# Patient Record
Sex: FEMALE | Race: OTHER | HISPANIC OR LATINO | URBAN - METROPOLITAN AREA
[De-identification: names, ages, dates, MRNs, and addresses within clinical notes are randomized per-mention and may not be internally consistent; named-entity substitution may affect disease eponyms.]

---

## 2018-03-06 ENCOUNTER — INPATIENT (INPATIENT)
Facility: HOSPITAL | Age: 78
LOS: 6 days | Discharge: NURSING, REHAB OR LONG-TERM CARE | DRG: 391 | End: 2018-03-13
Attending: INTERNAL MEDICINE | Admitting: INTERNAL MEDICINE
Payer: MEDICARE

## 2018-03-06 VITALS
OXYGEN SATURATION: 99 % | SYSTOLIC BLOOD PRESSURE: 102 MMHG | DIASTOLIC BLOOD PRESSURE: 62 MMHG | HEART RATE: 70 BPM | WEIGHT: 119.05 LBS

## 2018-03-06 DIAGNOSIS — K52.9 NONINFECTIVE GASTROENTERITIS AND COLITIS, UNSPECIFIED: ICD-10-CM

## 2018-03-06 DIAGNOSIS — Z29.9 ENCOUNTER FOR PROPHYLACTIC MEASURES, UNSPECIFIED: ICD-10-CM

## 2018-03-06 DIAGNOSIS — I10 ESSENTIAL (PRIMARY) HYPERTENSION: ICD-10-CM

## 2018-03-06 DIAGNOSIS — F03.90 UNSPECIFIED DEMENTIA WITHOUT BEHAVIORAL DISTURBANCE: ICD-10-CM

## 2018-03-06 LAB
ALBUMIN SERPL ELPH-MCNC: 2.7 G/DL — LOW (ref 3.5–5)
ALP SERPL-CCNC: 106 U/L — SIGNIFICANT CHANGE UP (ref 40–120)
ALT FLD-CCNC: 35 U/L DA — SIGNIFICANT CHANGE UP (ref 10–60)
ANION GAP SERPL CALC-SCNC: 7 MMOL/L — SIGNIFICANT CHANGE UP (ref 5–17)
APPEARANCE UR: CLEAR — SIGNIFICANT CHANGE UP
APTT BLD: 26.2 SEC — LOW (ref 27.5–37.4)
AST SERPL-CCNC: 32 U/L — SIGNIFICANT CHANGE UP (ref 10–40)
BILIRUB SERPL-MCNC: 0.3 MG/DL — SIGNIFICANT CHANGE UP (ref 0.2–1.2)
BILIRUB UR-MCNC: NEGATIVE — SIGNIFICANT CHANGE UP
BUN SERPL-MCNC: 26 MG/DL — HIGH (ref 7–18)
CALCIUM SERPL-MCNC: 8.7 MG/DL — SIGNIFICANT CHANGE UP (ref 8.4–10.5)
CHLORIDE SERPL-SCNC: 110 MMOL/L — HIGH (ref 96–108)
CO2 SERPL-SCNC: 26 MMOL/L — SIGNIFICANT CHANGE UP (ref 22–31)
COLOR SPEC: YELLOW — SIGNIFICANT CHANGE UP
CREAT SERPL-MCNC: 0.74 MG/DL — SIGNIFICANT CHANGE UP (ref 0.5–1.3)
DIFF PNL FLD: ABNORMAL
GLUCOSE SERPL-MCNC: 81 MG/DL — SIGNIFICANT CHANGE UP (ref 70–99)
GLUCOSE UR QL: NEGATIVE — SIGNIFICANT CHANGE UP
HCT VFR BLD CALC: 25 % — LOW (ref 34.5–45)
HGB BLD-MCNC: 8.3 G/DL — LOW (ref 11.5–15.5)
INR BLD: 0.95 RATIO — SIGNIFICANT CHANGE UP (ref 0.88–1.16)
KETONES UR-MCNC: NEGATIVE — SIGNIFICANT CHANGE UP
LEUKOCYTE ESTERASE UR-ACNC: ABNORMAL
LYMPHOCYTES # BLD AUTO: 1 % — LOW (ref 13–44)
MCHC RBC-ENTMCNC: 30.9 PG — SIGNIFICANT CHANGE UP (ref 27–34)
MCHC RBC-ENTMCNC: 33.2 GM/DL — SIGNIFICANT CHANGE UP (ref 32–36)
MCV RBC AUTO: 93 FL — SIGNIFICANT CHANGE UP (ref 80–100)
MONOCYTES NFR BLD AUTO: 2 % — SIGNIFICANT CHANGE UP (ref 2–14)
NEUTROPHILS NFR BLD AUTO: 86 % — HIGH (ref 43–77)
NITRITE UR-MCNC: NEGATIVE — SIGNIFICANT CHANGE UP
OB PNL STL: NEGATIVE — SIGNIFICANT CHANGE UP
PH UR: 6 — SIGNIFICANT CHANGE UP (ref 5–8)
PLATELET # BLD AUTO: 331 K/UL — SIGNIFICANT CHANGE UP (ref 150–400)
POTASSIUM SERPL-MCNC: 4.4 MMOL/L — SIGNIFICANT CHANGE UP (ref 3.5–5.3)
POTASSIUM SERPL-SCNC: 4.4 MMOL/L — SIGNIFICANT CHANGE UP (ref 3.5–5.3)
PROT SERPL-MCNC: 6.3 G/DL — SIGNIFICANT CHANGE UP (ref 6–8.3)
PROT UR-MCNC: 30 MG/DL
PROTHROM AB SERPL-ACNC: 10.3 SEC — SIGNIFICANT CHANGE UP (ref 9.8–12.7)
RBC # BLD: 2.69 M/UL — LOW (ref 3.8–5.2)
RBC # FLD: 13.2 % — SIGNIFICANT CHANGE UP (ref 10.3–14.5)
SODIUM SERPL-SCNC: 143 MMOL/L — SIGNIFICANT CHANGE UP (ref 135–145)
SP GR SPEC: 1.01 — SIGNIFICANT CHANGE UP (ref 1.01–1.02)
TROPONIN I SERPL-MCNC: <0.015 NG/ML — SIGNIFICANT CHANGE UP (ref 0–0.04)
UROBILINOGEN FLD QL: NEGATIVE — SIGNIFICANT CHANGE UP
WBC # BLD: 29.5 K/UL — HIGH (ref 3.8–10.5)
WBC # FLD AUTO: 29.5 K/UL — HIGH (ref 3.8–10.5)

## 2018-03-06 PROCEDURE — 99285 EMERGENCY DEPT VISIT HI MDM: CPT

## 2018-03-06 PROCEDURE — 74177 CT ABD & PELVIS W/CONTRAST: CPT | Mod: 26

## 2018-03-06 PROCEDURE — 71045 X-RAY EXAM CHEST 1 VIEW: CPT | Mod: 26

## 2018-03-06 RX ORDER — SODIUM CHLORIDE 9 MG/ML
1000 INJECTION INTRAMUSCULAR; INTRAVENOUS; SUBCUTANEOUS
Qty: 0 | Refills: 0 | Status: DISCONTINUED | OUTPATIENT
Start: 2018-03-06 | End: 2018-03-08

## 2018-03-06 RX ORDER — PANTOPRAZOLE SODIUM 20 MG/1
40 TABLET, DELAYED RELEASE ORAL DAILY
Qty: 0 | Refills: 0 | Status: DISCONTINUED | OUTPATIENT
Start: 2018-03-06 | End: 2018-03-11

## 2018-03-06 RX ORDER — METRONIDAZOLE 500 MG
500 TABLET ORAL ONCE
Qty: 0 | Refills: 0 | Status: COMPLETED | OUTPATIENT
Start: 2018-03-06 | End: 2018-03-06

## 2018-03-06 RX ORDER — CIPROFLOXACIN LACTATE 400MG/40ML
400 VIAL (ML) INTRAVENOUS EVERY 12 HOURS
Qty: 0 | Refills: 0 | Status: DISCONTINUED | OUTPATIENT
Start: 2018-03-06 | End: 2018-03-13

## 2018-03-06 RX ORDER — CIPROFLOXACIN LACTATE 400MG/40ML
400 VIAL (ML) INTRAVENOUS ONCE
Qty: 0 | Refills: 0 | Status: COMPLETED | OUTPATIENT
Start: 2018-03-06 | End: 2018-03-06

## 2018-03-06 RX ORDER — METRONIDAZOLE 500 MG
500 TABLET ORAL EVERY 8 HOURS
Qty: 0 | Refills: 0 | Status: DISCONTINUED | OUTPATIENT
Start: 2018-03-06 | End: 2018-03-13

## 2018-03-06 RX ORDER — SODIUM CHLORIDE 9 MG/ML
1000 INJECTION INTRAMUSCULAR; INTRAVENOUS; SUBCUTANEOUS ONCE
Qty: 0 | Refills: 0 | Status: COMPLETED | OUTPATIENT
Start: 2018-03-06 | End: 2018-03-06

## 2018-03-06 RX ADMIN — Medication 200 MILLIGRAM(S): at 21:32

## 2018-03-06 RX ADMIN — SODIUM CHLORIDE 4000 MILLILITER(S): 9 INJECTION INTRAMUSCULAR; INTRAVENOUS; SUBCUTANEOUS at 21:00

## 2018-03-06 RX ADMIN — Medication 100 MILLIGRAM(S): at 21:32

## 2018-03-06 RX ADMIN — PANTOPRAZOLE SODIUM 40 MILLIGRAM(S): 20 TABLET, DELAYED RELEASE ORAL at 23:55

## 2018-03-06 NOTE — H&P ADULT - RS GEN PE MLT RESP DETAILS PC
good air movement/breath sounds equal/no rhonchi/no intercostal retractions/respirations non-labored/no chest wall tenderness/airway patent/no rales/clear to auscultation bilaterally

## 2018-03-06 NOTE — H&P ADULT - PROBLEM SELECTOR PLAN 6
[] Previous VTE                                                3  [] Thrombophilia                                             2  [] Lower limb paralysis                                   2    [] Current Cancer                                             2   [x] Immobilization > 24 hrs                              1  [] ICU/CCU stay > 24 hours                             1  [x] Age > 60                                                         1    IMPROVE VTE Score: DVT prophy on hold due to risk of bleed -Recently diagnosed in [place], but never underwent treatment

## 2018-03-06 NOTE — H&P ADULT - PROBLEM SELECTOR PLAN 4
-c/w seroquel therapy -Worsening per daughter; states pt hallucinates frequently   -c/w Seroquel therapy -Controlled  -c/w home dose meds w/ parameters  -monitor BP

## 2018-03-06 NOTE — ED CLERICAL - DIVISION
[facility] [place]... [facility] [place]... [facility]

## 2018-03-06 NOTE — H&P ADULT - NSHPPHYSICALEXAM_GEN_ALL_CORE
Vital Signs Last 24 Hrs  T(C): 36.8 (06 Mar 2018 18:13), Max: 37.4 (06 Mar 2018 16:41)  T(F): 98.3 (06 Mar 2018 18:13), Max: 99.3 (06 Mar 2018 16:41)  HR: 76 (06 Mar 2018 16:54) (70 - 76)  BP: 106/51 (06 Mar 2018 16:54) (102/62 - 106/51)  RR: 15 (06 Mar 2018 16:41) (15 - 15)  SpO2: 99% (06 Mar 2018 16:41) (99% - 99%)

## 2018-03-06 NOTE — ED ADULT TRIAGE NOTE - WEIGHT IN KG
Problem: Goal Outcome Summary  Goal: Goal Outcome Summary  OT 4AB: Cancel - pt busy transferring to 6B upon attempt. Will reschedule.       54

## 2018-03-06 NOTE — H&P ADULT - PROBLEM SELECTOR PLAN 3
-Controlled  -c/w home dose meds w/ parameters  -monitor BP -Positive UA  -Pt demented at baseline and unable to express symptoms  -Afebrile, Leukocytosis to 29  -f/u Ucx  -c/w Cipro

## 2018-03-06 NOTE — ED PROVIDER NOTE - OBJECTIVE STATEMENT
77 F with hx of dementia, sent from facility with bloody stools.  Patient cannot give history at this time.  Per NH paperwork Hgb 9.7 (unclear when it was performed).

## 2018-03-06 NOTE — H&P ADULT - HISTORY OF PRESENT ILLNESS
77 F with hx of dementia, sent from facility with bloody stools. Pt is a 76y/o F, bedbound, from nursing home, very poor historian, w/ Pmhx of Dementia, HTN who presents after having blood bowel movement today.  Pt unable to provide history, history attained from chart records.  Pt has 2 episodes of blood streaked stool today in nursing home.  Labs performed which revealed Hb of 9.7.  Pt denied abd pain, or discomfort, N/V/D, or constipation.  Pt remained hemodynamically stable, and sent to ED for further evaluation.  In ED, pt presented in no acute distress, vitals remained stable, and pt remains asymptomatic. Pt denies CP, abd pain, N/V/D, fever/chills, SOB, or dizziness. Pt is a 76y/o F, bedbound, from nursing home, very poor historian, w/ Pmhx of Dementia, HTN who presents after having blood bowel movement today.  Pt unable to provide history, history attained from chart records.  Pt has 2 episodes of blood streaked stool today in nursing home.  Labs performed which revealed Hb of 9.7.  Pt denied abd pain, or discomfort, N/V/D, or constipation.  Pt remained hemodynamically stable, and sent to ED for further evaluation.  In ED, pt presented in no acute distress, vitals remained stable, and pt remains asymptomatic. Pt denies CP, abd pain, N/V/D, fever/chills, SOB, or dizziness.     In the ED, Pt in no acute distress, Vitals WNL, and EKG NSR @ 64bpm Pt is a 78y/o F, bedbound, from [facility], very poor historian, w/ Pmhx of Dementia, HTN, Multiple Sclerosis who presents after having blood bowel movement today.  Pt unable to provide history, history attained from chart records.  Pt has 2 episodes of blood streaked stool today in nursing home.  Labs performed which revealed Hb of 9.7.  Pt denied abd pain, or discomfort, N/V/D, or constipation.  Pt remained hemodynamically stable, and sent to ED for further evaluation.  In ED, pt presented in no acute distress, vitals remained stable, and pt remains asymptomatic. Pt denies CP, abd pain, N/V/D, fever/chills, SOB, or dizziness.     In the ED, Pt in no acute distress, Vitals WNL, and EKG NSR @ 64bpm

## 2018-03-06 NOTE — H&P ADULT - PROBLEM SELECTOR PLAN 8
-Spoke with Daughter, Laly, who states she is pt's HCP  -DNR/DNI  -ok w/ lines and pressors if needed

## 2018-03-06 NOTE — ED PROVIDER NOTE - MEDICAL DECISION MAKING DETAILS
will check labs, ct scan occult blood, likely admission.  No current fever so will hold off on antibiotics pending CT

## 2018-03-06 NOTE — H&P ADULT - PROBLEM SELECTOR PLAN 1
-2 blood streaked BM's at nursing home- no abd pain or distress  -Hb 8.3 on presentation; 9.7 at nursing home  -Occult neg x 2  -CT abd sig for proctocolitis  -Pt has good appetite, and denies N/V- clear liquid diet  -c/w Cipro/flagyl   -f/u Iron studies  -GI: Dr. Lau -2 blood streaked BM's at nursing home- no abd pain or distress  -Hb 8.3 on presentation; 9.7 at nursing home  -Occult Pos  -CT abd sig for proctocolitis  -Pt has good appetite, and denies N/V- clear liquid diet  -c/w Cipro/flagyl  -c/w protonix for GI prophy   -f/u Iron studies  -GI: Dr. Lau -2 blood streaked BM's at nursing home- no abd pain or distress  -Hb 8.3 on presentation; 9.7 at nursing home  -Occult Pos  -CT abd sig for proctocolitis  -Pt has good appetite, and denies N/V- clear liquid diet  -c/w Cipro/flagyl  -c/w protonix for GI prophy   -f/u AM Hb  -f/u Iron studies  -GI: Dr. Lau  -Attempted to contact pt's Proxy, Laly Shepherd, to discuss transfusion consent and GOC w/o success as pt is not consentable.  Please follow up in AM -2 blood streaked BM's at nursing home- no abd pain or distress  -Afebrile w/ Leukocytosis 29  -Hb 8.3 on presentation; 9.7 at nursing home  -Occult Pos  -CT abd sig for proctocolitis  -Pt has good appetite, and denies N/V- clear liquid diet  -c/w Cipro/flagyl  -c/w protonix for GI prophy   -f/u AM Hb  -f/u Iron studies  -GI: Dr. Lau  -Attempted to contact pt's Proxy, Laly Shepherd, to discuss transfusion consent and GOC w/o success as pt is not consentable.  Please follow up in AM -2 blood streaked BM's at nursing home- no abd pain or distress  -Afebrile w/ Leukocytosis 29  -Hb 8.3 on presentation; 9.7 at nursing home  -Occult Pos  -CT abd sig for proctocolitis  -Pt has good appetite, and denies N/V- clear liquid diet  -c/w Cipro/flagyl  -c/w protonix for GI prophy   -f/u AM Hb  -f/u Iron studies  -GI:  -2 blood streaked BM's at nursing home- no abd pain or distress  -Afebrile w/ Leukocytosis 29  -Hb 8.3 on presentation; 9.7 at nursing home  -Occult Pos  -CT abd sig for proctocolitis  -Pt has good appetite, and denies N/V- clear liquid diet  -c/w Cipro/flagyl  -c/w protonix for GI prophy   -transfuse 1 unit pRBC's, f/u post transfusion CBC  -f/u AM Hb  -f/u Iron studies  -No GI consult for now per Dr. Arias as daughter does not want to take aggressive measures.

## 2018-03-06 NOTE — H&P ADULT - PROBLEM SELECTOR PLAN 7
-Spoke with Daughter, Laly, who states she is pt's HCP  -DNR/DNI  -ok w/ lines and pressors if needed [] Previous VTE                                                3  [] Thrombophilia                                             2  [] Lower limb paralysis                                   2    [] Current Cancer                                             2   [x] Immobilization > 24 hrs                              1  [] ICU/CCU stay > 24 hours                             1  [x] Age > 60                                                         1    IMPROVE VTE Score: DVT prophy on hold due to risk of bleed

## 2018-03-06 NOTE — H&P ADULT - PROBLEM SELECTOR PLAN 5
[] Previous VTE                                                3  [] Thrombophilia                                             2  [] Lower limb paralysis                                   2    [] Current Cancer                                             2   [x] Immobilization > 24 hrs                              1  [] ICU/CCU stay > 24 hours                             1  [x] Age > 60                                                         1    IMPROVE VTE Score: DVT prophy on hold due to risk of bleed -Recently diagnosed in [place], but never underwent treatment -Worsening per daughter; states pt hallucinates frequently   -c/w Seroquel therapy

## 2018-03-06 NOTE — H&P ADULT - PROBLEM SELECTOR PLAN 2
-Positive UA  -Pt demented at baseline and unable to express symptoms  -Afebrile, no leukocytosis  -f/u Ucx  -c/w Cipro -Positive UA  -Pt demented at baseline and unable to express symptoms  -Afebrile, Leukocytosis to 29  -f/u Ucx  -c/w Cipro -Likely due to blood loss from colitis  -Occult pos  -Hb downtrending 8.3-->8  -Will transfuse 1 unit; f/u post transfusion CBC

## 2018-03-07 DIAGNOSIS — D64.9 ANEMIA, UNSPECIFIED: ICD-10-CM

## 2018-03-07 DIAGNOSIS — G35 MULTIPLE SCLEROSIS: ICD-10-CM

## 2018-03-07 DIAGNOSIS — N39.0 URINARY TRACT INFECTION, SITE NOT SPECIFIED: ICD-10-CM

## 2018-03-07 DIAGNOSIS — Z71.89 OTHER SPECIFIED COUNSELING: ICD-10-CM

## 2018-03-07 DIAGNOSIS — Z02.9 ENCOUNTER FOR ADMINISTRATIVE EXAMINATIONS, UNSPECIFIED: ICD-10-CM

## 2018-03-07 LAB
24R-OH-CALCIDIOL SERPL-MCNC: 26.9 NG/ML — LOW (ref 30–80)
ABO RH CONFIRMATION: SIGNIFICANT CHANGE UP
ALBUMIN SERPL ELPH-MCNC: 2.7 G/DL — LOW (ref 3.5–5)
ALP SERPL-CCNC: 110 U/L — SIGNIFICANT CHANGE UP (ref 40–120)
ALT FLD-CCNC: 33 U/L DA — SIGNIFICANT CHANGE UP (ref 10–60)
ANION GAP SERPL CALC-SCNC: 9 MMOL/L — SIGNIFICANT CHANGE UP (ref 5–17)
AST SERPL-CCNC: 29 U/L — SIGNIFICANT CHANGE UP (ref 10–40)
BASOPHILS # BLD AUTO: 0 K/UL — SIGNIFICANT CHANGE UP (ref 0–0.2)
BASOPHILS NFR BLD AUTO: 0.1 % — SIGNIFICANT CHANGE UP (ref 0–2)
BILIRUB SERPL-MCNC: 0.3 MG/DL — SIGNIFICANT CHANGE UP (ref 0.2–1.2)
BUN SERPL-MCNC: 26 MG/DL — HIGH (ref 7–18)
CALCIUM SERPL-MCNC: 8.7 MG/DL — SIGNIFICANT CHANGE UP (ref 8.4–10.5)
CHLORIDE SERPL-SCNC: 112 MMOL/L — HIGH (ref 96–108)
CHOLEST SERPL-MCNC: 110 MG/DL — SIGNIFICANT CHANGE UP (ref 10–199)
CO2 SERPL-SCNC: 22 MMOL/L — SIGNIFICANT CHANGE UP (ref 22–31)
CREAT SERPL-MCNC: 0.61 MG/DL — SIGNIFICANT CHANGE UP (ref 0.5–1.3)
EOSINOPHIL # BLD AUTO: 0 K/UL — SIGNIFICANT CHANGE UP (ref 0–0.5)
EOSINOPHIL NFR BLD AUTO: 0 % — SIGNIFICANT CHANGE UP (ref 0–6)
FERRITIN SERPL-MCNC: 70 NG/ML — SIGNIFICANT CHANGE UP (ref 15–150)
FOLATE SERPL-MCNC: 14.1 NG/ML — SIGNIFICANT CHANGE UP (ref 4.8–24.2)
GLUCOSE BLDC GLUCOMTR-MCNC: 171 MG/DL — HIGH (ref 70–99)
GLUCOSE SERPL-MCNC: 63 MG/DL — LOW (ref 70–99)
HAPTOGLOB SERPL-MCNC: 196 MG/DL — SIGNIFICANT CHANGE UP (ref 34–200)
HBA1C BLD-MCNC: 5.3 % — SIGNIFICANT CHANGE UP (ref 4–5.6)
HCT VFR BLD CALC: 25.5 % — LOW (ref 34.5–45)
HCT VFR BLD CALC: 27.2 % — LOW (ref 34.5–45)
HCT VFR BLD CALC: 33.2 % — LOW (ref 34.5–45)
HDLC SERPL-MCNC: 64 MG/DL — SIGNIFICANT CHANGE UP (ref 40–125)
HGB BLD-MCNC: 10.5 G/DL — LOW (ref 11.5–15.5)
HGB BLD-MCNC: 7.7 G/DL — LOW (ref 11.5–15.5)
HGB BLD-MCNC: 8 G/DL — LOW (ref 11.5–15.5)
INR BLD: 0.96 RATIO — SIGNIFICANT CHANGE UP (ref 0.88–1.16)
IRON SATN MFR SERPL: 10 UG/DL — LOW (ref 40–150)
IRON SATN MFR SERPL: 4 % — LOW (ref 15–50)
LDH SERPL L TO P-CCNC: 207 U/L — SIGNIFICANT CHANGE UP (ref 120–225)
LIPID PNL WITH DIRECT LDL SERPL: 29 MG/DL — SIGNIFICANT CHANGE UP
LYMPHOCYTES # BLD AUTO: 0.5 K/UL — LOW (ref 1–3.3)
LYMPHOCYTES # BLD AUTO: 1.8 % — LOW (ref 13–44)
MAGNESIUM SERPL-MCNC: 2.3 MG/DL — SIGNIFICANT CHANGE UP (ref 1.6–2.6)
MCHC RBC-ENTMCNC: 28 PG — SIGNIFICANT CHANGE UP (ref 27–34)
MCHC RBC-ENTMCNC: 28.6 PG — SIGNIFICANT CHANGE UP (ref 27–34)
MCHC RBC-ENTMCNC: 29.2 PG — SIGNIFICANT CHANGE UP (ref 27–34)
MCHC RBC-ENTMCNC: 29.6 GM/DL — LOW (ref 32–36)
MCHC RBC-ENTMCNC: 30.2 GM/DL — LOW (ref 32–36)
MCHC RBC-ENTMCNC: 31.6 GM/DL — LOW (ref 32–36)
MCV RBC AUTO: 92.4 FL — SIGNIFICANT CHANGE UP (ref 80–100)
MCV RBC AUTO: 94.6 FL — SIGNIFICANT CHANGE UP (ref 80–100)
MCV RBC AUTO: 94.9 FL — SIGNIFICANT CHANGE UP (ref 80–100)
MONOCYTES # BLD AUTO: 0.9 K/UL — SIGNIFICANT CHANGE UP (ref 0–0.9)
MONOCYTES NFR BLD AUTO: 3.1 % — SIGNIFICANT CHANGE UP (ref 2–14)
NEUTROPHILS # BLD AUTO: 27.2 K/UL — HIGH (ref 1.8–7.4)
NEUTROPHILS NFR BLD AUTO: 95 % — HIGH (ref 43–77)
OB PNL STL: POSITIVE
PHOSPHATE SERPL-MCNC: 3.4 MG/DL — SIGNIFICANT CHANGE UP (ref 2.5–4.5)
PLATELET # BLD AUTO: 323 K/UL — SIGNIFICANT CHANGE UP (ref 150–400)
PLATELET # BLD AUTO: 339 K/UL — SIGNIFICANT CHANGE UP (ref 150–400)
PLATELET # BLD AUTO: 344 K/UL — SIGNIFICANT CHANGE UP (ref 150–400)
POTASSIUM SERPL-MCNC: 3.5 MMOL/L — SIGNIFICANT CHANGE UP (ref 3.5–5.3)
POTASSIUM SERPL-SCNC: 3.5 MMOL/L — SIGNIFICANT CHANGE UP (ref 3.5–5.3)
PROT SERPL-MCNC: 6.2 G/DL — SIGNIFICANT CHANGE UP (ref 6–8.3)
PROTHROM AB SERPL-ACNC: 10.4 SEC — SIGNIFICANT CHANGE UP (ref 9.8–12.7)
RBC # BLD: 2.53 M/UL — LOW (ref 3.8–5.2)
RBC # BLD: 2.69 M/UL — LOW (ref 3.8–5.2)
RBC # BLD: 2.87 M/UL — LOW (ref 3.8–5.2)
RBC # BLD: 3.59 M/UL — LOW (ref 3.8–5.2)
RBC # FLD: 13.1 % — SIGNIFICANT CHANGE UP (ref 10.3–14.5)
RBC # FLD: 13.2 % — SIGNIFICANT CHANGE UP (ref 10.3–14.5)
RBC # FLD: 13.4 % — SIGNIFICANT CHANGE UP (ref 10.3–14.5)
RETICS #: 31.6 K/UL — SIGNIFICANT CHANGE UP (ref 25–125)
RETICS/RBC NFR: 1.3 % — SIGNIFICANT CHANGE UP (ref 0.5–2.5)
SODIUM SERPL-SCNC: 143 MMOL/L — SIGNIFICANT CHANGE UP (ref 135–145)
TIBC SERPL-MCNC: 277 UG/DL — SIGNIFICANT CHANGE UP (ref 250–450)
TOTAL CHOLESTEROL/HDL RATIO MEASUREMENT: 1.7 RATIO — LOW (ref 3.3–7.1)
TRIGL SERPL-MCNC: 83 MG/DL — SIGNIFICANT CHANGE UP (ref 10–149)
TSH SERPL-MCNC: 0.8 UU/ML — SIGNIFICANT CHANGE UP (ref 0.34–4.82)
UIBC SERPL-MCNC: 267 UG/DL — SIGNIFICANT CHANGE UP (ref 110–370)
VIT B12 SERPL-MCNC: 852 PG/ML — SIGNIFICANT CHANGE UP (ref 232–1245)
WBC # BLD: 27.8 K/UL — HIGH (ref 3.8–10.5)
WBC # BLD: 28.6 K/UL — HIGH (ref 3.8–10.5)
WBC # BLD: 31.5 K/UL — HIGH (ref 3.8–10.5)
WBC # FLD AUTO: 27.8 K/UL — HIGH (ref 3.8–10.5)
WBC # FLD AUTO: 28.6 K/UL — HIGH (ref 3.8–10.5)
WBC # FLD AUTO: 31.5 K/UL — HIGH (ref 3.8–10.5)

## 2018-03-07 PROCEDURE — 93010 ELECTROCARDIOGRAM REPORT: CPT

## 2018-03-07 RX ORDER — QUETIAPINE FUMARATE 200 MG/1
12.5 TABLET, FILM COATED ORAL AT BEDTIME
Qty: 0 | Refills: 0 | Status: DISCONTINUED | OUTPATIENT
Start: 2018-03-07 | End: 2018-03-13

## 2018-03-07 RX ORDER — AMLODIPINE BESYLATE 2.5 MG/1
1 TABLET ORAL
Qty: 0 | Refills: 0 | COMMUNITY

## 2018-03-07 RX ORDER — AMLODIPINE BESYLATE 2.5 MG/1
10 TABLET ORAL DAILY
Qty: 0 | Refills: 0 | Status: DISCONTINUED | OUTPATIENT
Start: 2018-03-07 | End: 2018-03-13

## 2018-03-07 RX ORDER — QUETIAPINE FUMARATE 200 MG/1
0.5 TABLET, FILM COATED ORAL
Qty: 0 | Refills: 0 | COMMUNITY

## 2018-03-07 RX ORDER — METOPROLOL TARTRATE 50 MG
25 TABLET ORAL DAILY
Qty: 0 | Refills: 0 | Status: DISCONTINUED | OUTPATIENT
Start: 2018-03-07 | End: 2018-03-13

## 2018-03-07 RX ORDER — METOPROLOL TARTRATE 50 MG
1 TABLET ORAL
Qty: 0 | Refills: 0 | COMMUNITY

## 2018-03-07 RX ADMIN — QUETIAPINE FUMARATE 12.5 MILLIGRAM(S): 200 TABLET, FILM COATED ORAL at 22:50

## 2018-03-07 RX ADMIN — PANTOPRAZOLE SODIUM 40 MILLIGRAM(S): 20 TABLET, DELAYED RELEASE ORAL at 12:45

## 2018-03-07 RX ADMIN — SODIUM CHLORIDE 75 MILLILITER(S): 9 INJECTION INTRAMUSCULAR; INTRAVENOUS; SUBCUTANEOUS at 09:56

## 2018-03-07 RX ADMIN — AMLODIPINE BESYLATE 10 MILLIGRAM(S): 2.5 TABLET ORAL at 06:12

## 2018-03-07 RX ADMIN — Medication 100 MILLIGRAM(S): at 17:29

## 2018-03-07 RX ADMIN — Medication 200 MILLIGRAM(S): at 22:28

## 2018-03-07 RX ADMIN — Medication 25 MILLIGRAM(S): at 06:11

## 2018-03-07 RX ADMIN — Medication 100 MILLIGRAM(S): at 06:07

## 2018-03-07 RX ADMIN — Medication 200 MILLIGRAM(S): at 09:55

## 2018-03-07 NOTE — ED ADULT NURSE NOTE - ED STAT RN HANDOFF DETAILS
Endorsed to SHAKA Barron in stable condition.  Notify of potassium level, pending medication administration

## 2018-03-07 NOTE — ED ADULT NURSE NOTE - ED STAT RN HANDOFF DETAILS 4
Report given and accepted by SHAKA Sabillon Report given and accepted by SHAKA Sabillon, endorsed to JAD Rosado awaiting transport

## 2018-03-08 DIAGNOSIS — E87.6 HYPOKALEMIA: ICD-10-CM

## 2018-03-08 LAB
ANION GAP SERPL CALC-SCNC: 11 MMOL/L — SIGNIFICANT CHANGE UP (ref 5–17)
ANION GAP SERPL CALC-SCNC: 12 MMOL/L — SIGNIFICANT CHANGE UP (ref 5–17)
BUN SERPL-MCNC: 19 MG/DL — HIGH (ref 7–18)
BUN SERPL-MCNC: 32 MG/DL — HIGH (ref 7–18)
C DIFF BY PCR RESULT: SIGNIFICANT CHANGE UP
C DIFF TOX GENS STL QL NAA+PROBE: SIGNIFICANT CHANGE UP
CALCIUM SERPL-MCNC: 7.8 MG/DL — LOW (ref 8.4–10.5)
CALCIUM SERPL-MCNC: 8.3 MG/DL — LOW (ref 8.4–10.5)
CHLORIDE SERPL-SCNC: 114 MMOL/L — HIGH (ref 96–108)
CHLORIDE SERPL-SCNC: 116 MMOL/L — HIGH (ref 96–108)
CO2 SERPL-SCNC: 17 MMOL/L — LOW (ref 22–31)
CO2 SERPL-SCNC: 21 MMOL/L — LOW (ref 22–31)
CREAT SERPL-MCNC: 0.7 MG/DL — SIGNIFICANT CHANGE UP (ref 0.5–1.3)
CREAT SERPL-MCNC: 1.07 MG/DL — SIGNIFICANT CHANGE UP (ref 0.5–1.3)
GLUCOSE SERPL-MCNC: 126 MG/DL — HIGH (ref 70–99)
GLUCOSE SERPL-MCNC: 89 MG/DL — SIGNIFICANT CHANGE UP (ref 70–99)
HCT VFR BLD CALC: 29 % — LOW (ref 34.5–45)
HGB BLD-MCNC: 9.6 G/DL — LOW (ref 11.5–15.5)
MCHC RBC-ENTMCNC: 30.7 PG — SIGNIFICANT CHANGE UP (ref 27–34)
MCHC RBC-ENTMCNC: 33.2 GM/DL — SIGNIFICANT CHANGE UP (ref 32–36)
MCV RBC AUTO: 92.5 FL — SIGNIFICANT CHANGE UP (ref 80–100)
PLATELET # BLD AUTO: 288 K/UL — SIGNIFICANT CHANGE UP (ref 150–400)
POTASSIUM SERPL-MCNC: 2.6 MMOL/L — CRITICAL LOW (ref 3.5–5.3)
POTASSIUM SERPL-MCNC: 3.7 MMOL/L — SIGNIFICANT CHANGE UP (ref 3.5–5.3)
POTASSIUM SERPL-SCNC: 2.6 MMOL/L — CRITICAL LOW (ref 3.5–5.3)
POTASSIUM SERPL-SCNC: 3.7 MMOL/L — SIGNIFICANT CHANGE UP (ref 3.5–5.3)
RBC # BLD: 3.14 M/UL — LOW (ref 3.8–5.2)
RBC # FLD: 13.7 % — SIGNIFICANT CHANGE UP (ref 10.3–14.5)
SODIUM SERPL-SCNC: 145 MMOL/L — SIGNIFICANT CHANGE UP (ref 135–145)
SODIUM SERPL-SCNC: 146 MMOL/L — HIGH (ref 135–145)
TRANSFERRIN SERPL-MCNC: 208 MG/DL — SIGNIFICANT CHANGE UP (ref 200–360)
WBC # BLD: 18.6 K/UL — HIGH (ref 3.8–10.5)
WBC # FLD AUTO: 18.6 K/UL — HIGH (ref 3.8–10.5)

## 2018-03-08 RX ORDER — SODIUM CHLORIDE 9 MG/ML
1000 INJECTION, SOLUTION INTRAVENOUS
Qty: 0 | Refills: 0 | Status: DISCONTINUED | OUTPATIENT
Start: 2018-03-08 | End: 2018-03-13

## 2018-03-08 RX ORDER — POTASSIUM CHLORIDE 20 MEQ
40 PACKET (EA) ORAL EVERY 4 HOURS
Qty: 0 | Refills: 0 | Status: DISCONTINUED | OUTPATIENT
Start: 2018-03-08 | End: 2018-03-08

## 2018-03-08 RX ORDER — POTASSIUM CHLORIDE 20 MEQ
10 PACKET (EA) ORAL
Qty: 0 | Refills: 0 | Status: COMPLETED | OUTPATIENT
Start: 2018-03-08 | End: 2018-03-08

## 2018-03-08 RX ADMIN — Medication 100 MILLIGRAM(S): at 22:59

## 2018-03-08 RX ADMIN — Medication 100 MILLIEQUIVALENT(S): at 09:56

## 2018-03-08 RX ADMIN — Medication 100 MILLIEQUIVALENT(S): at 08:18

## 2018-03-08 RX ADMIN — SODIUM CHLORIDE 50 MILLILITER(S): 9 INJECTION, SOLUTION INTRAVENOUS at 09:58

## 2018-03-08 RX ADMIN — Medication 100 MILLIGRAM(S): at 02:35

## 2018-03-08 RX ADMIN — Medication 200 MILLIGRAM(S): at 23:31

## 2018-03-08 RX ADMIN — PANTOPRAZOLE SODIUM 40 MILLIGRAM(S): 20 TABLET, DELAYED RELEASE ORAL at 13:06

## 2018-03-08 RX ADMIN — Medication 200 MILLIGRAM(S): at 13:01

## 2018-03-08 RX ADMIN — QUETIAPINE FUMARATE 12.5 MILLIGRAM(S): 200 TABLET, FILM COATED ORAL at 22:59

## 2018-03-08 RX ADMIN — Medication 100 MILLIEQUIVALENT(S): at 13:10

## 2018-03-08 RX ADMIN — Medication 100 MILLIGRAM(S): at 12:33

## 2018-03-08 NOTE — ED ADULT NURSE REASSESSMENT NOTE - NS ED NURSE REASSESS COMMENT FT1
Pt. is in stable condition. No complaints voiced. No signs of acute distress noted. Pt. offered food and tolerated well. unable to collect stool sample during this shift, no BM. Pt. repositioned through out the shift. Pt. awaiting bed. Pt. remains on contact precaution to rule out c-diff. will continue to monitor closely and provide continuos care.

## 2018-03-08 NOTE — PROGRESS NOTE ADULT - PROBLEM SELECTOR PLAN 8
Per daughter-Laly, stated she's HCP, DNR/DNI, OK w/ lines and pressors if needed
Per daughter-Laly, is HCP  DNR/  Per SW, family interested in Long Term placement

## 2018-03-09 LAB
ANION GAP SERPL CALC-SCNC: 10 MMOL/L — SIGNIFICANT CHANGE UP (ref 5–17)
BUN SERPL-MCNC: 12 MG/DL — SIGNIFICANT CHANGE UP (ref 7–18)
CALCIUM SERPL-MCNC: 8.4 MG/DL — SIGNIFICANT CHANGE UP (ref 8.4–10.5)
CHLORIDE SERPL-SCNC: 115 MMOL/L — HIGH (ref 96–108)
CO2 SERPL-SCNC: 19 MMOL/L — LOW (ref 22–31)
CREAT SERPL-MCNC: 0.55 MG/DL — SIGNIFICANT CHANGE UP (ref 0.5–1.3)
CULTURE RESULTS: SIGNIFICANT CHANGE UP
GLUCOSE SERPL-MCNC: 90 MG/DL — SIGNIFICANT CHANGE UP (ref 70–99)
HCT VFR BLD CALC: 35.8 % — SIGNIFICANT CHANGE UP (ref 34.5–45)
HGB BLD-MCNC: 11.2 G/DL — LOW (ref 11.5–15.5)
MCHC RBC-ENTMCNC: 29 PG — SIGNIFICANT CHANGE UP (ref 27–34)
MCHC RBC-ENTMCNC: 31.2 GM/DL — LOW (ref 32–36)
MCV RBC AUTO: 93 FL — SIGNIFICANT CHANGE UP (ref 80–100)
PLATELET # BLD AUTO: 315 K/UL — SIGNIFICANT CHANGE UP (ref 150–400)
POTASSIUM SERPL-MCNC: 3.6 MMOL/L — SIGNIFICANT CHANGE UP (ref 3.5–5.3)
POTASSIUM SERPL-SCNC: 3.6 MMOL/L — SIGNIFICANT CHANGE UP (ref 3.5–5.3)
RBC # BLD: 3.85 M/UL — SIGNIFICANT CHANGE UP (ref 3.8–5.2)
RBC # FLD: 13.6 % — SIGNIFICANT CHANGE UP (ref 10.3–14.5)
SODIUM SERPL-SCNC: 144 MMOL/L — SIGNIFICANT CHANGE UP (ref 135–145)
SPECIMEN SOURCE: SIGNIFICANT CHANGE UP
WBC # BLD: 10.8 K/UL — HIGH (ref 3.8–10.5)
WBC # FLD AUTO: 10.8 K/UL — HIGH (ref 3.8–10.5)

## 2018-03-09 RX ORDER — ACETAMINOPHEN 500 MG
650 TABLET ORAL EVERY 6 HOURS
Qty: 0 | Refills: 0 | Status: DISCONTINUED | OUTPATIENT
Start: 2018-03-09 | End: 2018-03-09

## 2018-03-09 RX ORDER — ACETAMINOPHEN 500 MG
650 TABLET ORAL EVERY 6 HOURS
Qty: 0 | Refills: 0 | Status: DISCONTINUED | OUTPATIENT
Start: 2018-03-09 | End: 2018-03-13

## 2018-03-09 RX ADMIN — PANTOPRAZOLE SODIUM 40 MILLIGRAM(S): 20 TABLET, DELAYED RELEASE ORAL at 15:02

## 2018-03-09 RX ADMIN — QUETIAPINE FUMARATE 12.5 MILLIGRAM(S): 200 TABLET, FILM COATED ORAL at 22:30

## 2018-03-09 RX ADMIN — Medication 25 MILLIGRAM(S): at 05:59

## 2018-03-09 RX ADMIN — Medication 100 MILLIGRAM(S): at 18:26

## 2018-03-09 RX ADMIN — AMLODIPINE BESYLATE 10 MILLIGRAM(S): 2.5 TABLET ORAL at 05:59

## 2018-03-09 RX ADMIN — Medication 650 MILLIGRAM(S): at 22:30

## 2018-03-09 RX ADMIN — Medication 200 MILLIGRAM(S): at 11:19

## 2018-03-09 RX ADMIN — Medication 650 MILLIGRAM(S): at 23:08

## 2018-03-09 RX ADMIN — Medication 200 MILLIGRAM(S): at 21:39

## 2018-03-09 RX ADMIN — Medication 100 MILLIGRAM(S): at 09:41

## 2018-03-09 NOTE — DIETITIAN INITIAL EVALUATION ADULT. - OTHER INFO
Nutrition consult requested for assessment & BMI <18. Patient from [facility]. Visited pt. alert but confused, poor historian, contacted daughter-Laly, reports pt. appetite poor for past 2 wks, eating small meals w/feeding assistance at NH & sometimes miss meals, denies nausea/vomiting or diarrhea but complaint of blood stools PTA, stated pt. always small frame & UBW 60-55 Lbs most of adult life but recently started lose wt. approx. 10 Lbs & current wt. 50 Lbs, also stated looking for long term placement & wants no aggressive measures presently. In house pt. consuming 30% of meal, observed lunch & d/w PCA, encourage po intake, daughter willing have oral supplement with meals, skin intact, Discussed with MD/RN.

## 2018-03-09 NOTE — DIETITIAN INITIAL EVALUATION ADULT. - PROBLEM SELECTOR PLAN 2
-Likely due to blood loss from colitis  -Occult pos  -Hb downtrending 8.3-->8  -Will transfuse 1 unit; f/u post transfusion CBC

## 2018-03-09 NOTE — PHYSICAL THERAPY INITIAL EVALUATION ADULT - GENERAL OBSERVATIONS, REHAB EVAL
awake, alert, demonstrates involuntary movements of all extremities; thumb-in-fist posturing of both UE; left hip adduction posturing; contracture of left hip and knee flexors

## 2018-03-09 NOTE — DISCHARGE NOTE ADULT - MEDICATION SUMMARY - MEDICATIONS TO TAKE
I will START or STAY ON the medications listed below when I get home from the hospital:    Flagyl 500 mg oral tablet  -- 1 tab(s) by mouth 3 times a day  contiune until 03/17/2018  -- Indication: For Colitis    SEROquel 25 mg oral tablet  -- 0.5 tab(s) by mouth once a day  -- Indication: For Dementia    Toprol-XL 25 mg oral tablet, extended release  -- 1 tab(s) by mouth once a day  -- Indication: For Hypertension    Norvasc 10 mg oral tablet  -- 1 tab(s) by mouth once a day  -- Indication: For Hypertension    ciprofloxacin 500 mg oral tablet  -- 1 tab(s) by mouth every 12 hours  continue until 03/17/2018  -- Indication: For Colitis

## 2018-03-09 NOTE — DIETITIAN INITIAL EVALUATION ADULT. - SOURCE
other (specify)/family/significant other/EMR & Daughter-Laly other (specify)/family/significant other/EMR & Daughter-Laly@[phone]

## 2018-03-09 NOTE — PHYSICAL THERAPY INITIAL EVALUATION ADULT - ADDITIONAL COMMENTS
needs extensive assist for bed<>chair transfers; non-ambulatory; uses wheelchair when out of bed; does not receive skilled rehabilitation services

## 2018-03-09 NOTE — DIETITIAN INITIAL EVALUATION ADULT. - PHYSICAL APPEARANCE
underweight/emaciated/debilitated/other (specify)/cachectic, severe bitemporal wasting, severe muscle mass loss, severe subcutaneous fat loss/contracted

## 2018-03-09 NOTE — PROGRESS NOTE ADULT - PROBLEM SELECTOR PLAN 3
Pt has asymptomatic UTI. On antibiotics.  - urine culture - coag negative staph > 100,000, most likely contamination will dc abx Pt has asymptomatic UTI. On antibiotics.  - urine culture - coag negative staph > 100,000, most likely contamination will c/w for colitis plan

## 2018-03-09 NOTE — DISCHARGE NOTE ADULT - PATIENT PORTAL LINK FT
You can access the [facility] Patient Portal, offered by [facility], by registering with the following website: [email or web address]

## 2018-03-09 NOTE — DIETITIAN INITIAL EVALUATION ADULT. - FACTORS AFF FOOD INTAKE
weakness, bedbound, colitis, anemia, multiple sclerosis/difficulty with food procurement/preparation/other (specify)

## 2018-03-09 NOTE — DISCHARGE NOTE ADULT - CARE PLAN
Principal Discharge DX:	Colitis  Goal:	to  resolve  Assessment and plan of treatment:	Take all medications as prescribed.  Seek medical assistance if you experience similar signs or symptoms as this hospitalization.  Follow up laboratory values and clinical status with your primary medical doctor within 10 days of hospital discharge  c/w abx - ciprofloxacin and flagyl until 03/17/2018  Secondary Diagnosis:	Hypertension  Assessment and plan of treatment:	Take all medications as prescribed.  Seek medical assistance if you experience similar signs or symptoms as this hospitalization.  Follow up laboratory values and clinical status with your primary medical doctor within 10 days of hospital discharge  Secondary Diagnosis:	Multiple sclerosis  Assessment and plan of treatment:	Take all medications as prescribed.  Seek medical assistance if you experience similar signs or symptoms as this hospitalization.  Follow up laboratory values and clinical status with your primary medical doctor within 10 days of hospital discharge  Secondary Diagnosis:	Dementia  Assessment and plan of treatment:	Take all medications as prescribed.  Seek medical assistance if you experience similar signs or symptoms as this hospitalization.  Follow up laboratory values and clinical status with your primary medical doctor within 10 days of hospital discharge

## 2018-03-09 NOTE — PHYSICAL THERAPY INITIAL EVALUATION ADULT - PASSIVE RANGE OF MOTION EXAMINATION, REHAB EVAL
deficits as listed below/no Passive ROM deficits were identified/limited hip extension to neutral and knee extension to end-range due to left hip and knee flexion contracture

## 2018-03-09 NOTE — DIETITIAN INITIAL EVALUATION ADULT. - PROBLEM SELECTOR PLAN 7
[] Previous VTE                                                3  [] Thrombophilia                                             2  [] Lower limb paralysis                                   2    [] Current Cancer                                             2   [x] Immobilization > 24 hrs                              1  [] ICU/CCU stay > 24 hours                             1  [x] Age > 60                                                         1    IMPROVE VTE Score: DVT prophy on hold due to risk of bleed

## 2018-03-09 NOTE — DISCHARGE NOTE ADULT - PLAN OF CARE
to  resolve Take all medications as prescribed.  Seek medical assistance if you experience similar signs or symptoms as this hospitalization.  Follow up laboratory values and clinical status with your primary medical doctor within 10 days of hospital discharge  c/w abx - ciprofloxacin and flagyl until 03/17/2018 Take all medications as prescribed.  Seek medical assistance if you experience similar signs or symptoms as this hospitalization.  Follow up laboratory values and clinical status with your primary medical doctor within 10 days of hospital discharge

## 2018-03-09 NOTE — DISCHARGE NOTE ADULT - HOSPITAL COURSE
Ms. Shepherd is a 77 year old female with PMHx of dementia, HTN, and multiple sclerosis who presented to the hospital on 3/6/18 from her nursing home after having two episodes of bloody bowel movements. In the ED, patient denied having any nausea/vomiting/fever and vitals were stable. Labs drawn showed leukocytosis of 29.5, Hg 8.3, and occult +. CT abdomen performed showed thickening involving the sigmoid colon and rectum consistent with proctocolitis. Additionally, urinalysis obtained showing moderate leukocyte esterases but patient asymptomatic. In ED, 1 unit of pRBC was given in addition to ciprofloxacin and flagyl for the proctocolitis and stool sample tested for C. diff. Patient admitted to [address] [place] [address] for treatment of proctocolitis and UTI. Once admitted, cipro and flagyl were continued, GI protonix started, and daughter did not want to obtain GI consult because they did not want aggressive testing being done. Urine culture results from 3/7 grew numerous coag negative staphylococcus and cipro/flagyl were continued. C. diff cultures came back negative on 3/8 and patient did not have any bloody bowel movements. Hg was stable at 9.6 Ms. Shepherd is a 77 year old female with PMHx of dementia, HTN, and multiple sclerosis who presented to the hospital on 3/6/18 from her nursing home after having two episodes of bloody bowel movements. In the ED, patient denied having any nausea/vomiting/fever and vitals were stable. Labs drawn showed leukocytosis of 29.5, Hg 8.3, and occult +. CT abdomen performed showed thickening involving the sigmoid colon and rectum consistent with proctocolitis. Additionally, urinalysis obtained showing moderate leukocyte esterases but patient asymptomatic. In ED, 1 unit of pRBC was given in addition to ciprofloxacin and flagyl for the proctocolitis and stool sample tested for C. diff. Patient admitted to [address] [place] [address] for treatment of proctocolitis and UTI. Once admitted, cipro and flagyl were continued, GI protonix started, and daughter did not want to obtain GI consult because they did not want aggressive testing being done. Urine culture results from 3/7 grew numerous coag negative staphylococcus and cipro/flagyl were continued. C. diff cultures came back negative on 3/8 and patient did not have any bloody bowel movements. Hg was stable at 9.6.

## 2018-03-09 NOTE — DIETITIAN INITIAL EVALUATION ADULT. - MD RECOMMEND
Add Ensure Enlive 1 can TID to Soft diet, as medically feasible (rec. least restrictive diet due to chronic illness & advance age)/po supplement

## 2018-03-09 NOTE — DIETITIAN INITIAL EVALUATION ADULT. - PROBLEM SELECTOR PLAN 1
-2 blood streaked BM's at nursing home- no abd pain or distress  -Afebrile w/ Leukocytosis 29  -Hb 8.3 on presentation; 9.7 at nursing home  -Occult Pos  -CT abd sig for proctocolitis  -Pt has good appetite, and denies N/V- clear liquid diet  -c/w Cipro/flagyl  -c/w protonix for GI prophy   -transfuse 1 unit pRBC's, f/u post transfusion CBC  -f/u AM Hb  -f/u Iron studies  -No GI consult for now per Dr. Arias as daughter does not want to take aggressive measures.

## 2018-03-09 NOTE — DIETITIAN INITIAL EVALUATION ADULT. - PROBLEM SELECTOR PLAN 3
-Positive UA  -Pt demented at baseline and unable to express symptoms  -Afebrile, Leukocytosis to 29  -f/u Ucx  -c/w Cipro

## 2018-03-09 NOTE — CHART NOTE - NSCHARTNOTEFT_GEN_A_CORE
Upon Nutritional Assessment by the Registered Dietitian your patient was determined to meet criteria / has evidence of the following diagnosis/diagnoses:          [ ]  Mild Protein Calorie Malnutrition        [ ]  Moderate Protein Calorie Malnutrition        [X ] Severe Protein Calorie Malnutrition        [ ] Unspecified Protein Calorie Malnutrition        [ X] Underweight / BMI <19        [ ] Morbid Obesity / BMI > 40      Findings as based on:  •  Comprehensive nutrition assessment and consultation  •  Calorie counts (nutrient intake analysis)  •  Food acceptance and intake status from observations by staff  •  Follow up  •  Patient education  •  Intervention secondary to interdisciplinary rounds  •   concerns      Treatment:    The following diet has been recommended:      PROVIDER Section:     By signing this assessment you are acknowledging and agree with the diagnosis/diagnoses assigned by the Registered Dietitian    Comments:  Add Ensure Enlive 1can TID, providing 1050 Kcal, Protein 60 grams, as medically feasible.

## 2018-03-10 LAB
BASOPHILS # BLD AUTO: 0 K/UL — SIGNIFICANT CHANGE UP (ref 0–0.2)
BASOPHILS NFR BLD AUTO: 0.3 % — SIGNIFICANT CHANGE UP (ref 0–2)
EOSINOPHIL # BLD AUTO: 0 K/UL — SIGNIFICANT CHANGE UP (ref 0–0.5)
EOSINOPHIL NFR BLD AUTO: 0.1 % — SIGNIFICANT CHANGE UP (ref 0–6)
HCT VFR BLD CALC: 33.8 % — LOW (ref 34.5–45)
HGB BLD-MCNC: 10.6 G/DL — LOW (ref 11.5–15.5)
LYMPHOCYTES # BLD AUTO: 0.5 K/UL — LOW (ref 1–3.3)
LYMPHOCYTES # BLD AUTO: 5.8 % — LOW (ref 13–44)
MCHC RBC-ENTMCNC: 28.7 PG — SIGNIFICANT CHANGE UP (ref 27–34)
MCHC RBC-ENTMCNC: 31.4 GM/DL — LOW (ref 32–36)
MCV RBC AUTO: 91.4 FL — SIGNIFICANT CHANGE UP (ref 80–100)
MONOCYTES # BLD AUTO: 0.5 K/UL — SIGNIFICANT CHANGE UP (ref 0–0.9)
MONOCYTES NFR BLD AUTO: 6.2 % — SIGNIFICANT CHANGE UP (ref 2–14)
NEUTROPHILS # BLD AUTO: 7.4 K/UL — SIGNIFICANT CHANGE UP (ref 1.8–7.4)
NEUTROPHILS NFR BLD AUTO: 87.5 % — HIGH (ref 43–77)
PLATELET # BLD AUTO: 279 K/UL — SIGNIFICANT CHANGE UP (ref 150–400)
RBC # BLD: 3.69 M/UL — LOW (ref 3.8–5.2)
RBC # FLD: 13.2 % — SIGNIFICANT CHANGE UP (ref 10.3–14.5)
WBC # BLD: 8.4 K/UL — SIGNIFICANT CHANGE UP (ref 3.8–10.5)
WBC # FLD AUTO: 8.4 K/UL — SIGNIFICANT CHANGE UP (ref 3.8–10.5)

## 2018-03-10 RX ADMIN — Medication 100 MILLIGRAM(S): at 09:00

## 2018-03-10 RX ADMIN — Medication 650 MILLIGRAM(S): at 05:19

## 2018-03-10 RX ADMIN — Medication 200 MILLIGRAM(S): at 11:58

## 2018-03-10 RX ADMIN — PANTOPRAZOLE SODIUM 40 MILLIGRAM(S): 20 TABLET, DELAYED RELEASE ORAL at 11:58

## 2018-03-10 RX ADMIN — AMLODIPINE BESYLATE 10 MILLIGRAM(S): 2.5 TABLET ORAL at 05:19

## 2018-03-10 RX ADMIN — QUETIAPINE FUMARATE 12.5 MILLIGRAM(S): 200 TABLET, FILM COATED ORAL at 22:05

## 2018-03-10 RX ADMIN — Medication 100 MILLIGRAM(S): at 17:13

## 2018-03-10 RX ADMIN — Medication 100 MILLIGRAM(S): at 00:02

## 2018-03-10 RX ADMIN — Medication 200 MILLIGRAM(S): at 22:05

## 2018-03-10 RX ADMIN — Medication 25 MILLIGRAM(S): at 05:19

## 2018-03-11 RX ORDER — PANTOPRAZOLE SODIUM 20 MG/1
40 TABLET, DELAYED RELEASE ORAL
Qty: 0 | Refills: 0 | Status: DISCONTINUED | OUTPATIENT
Start: 2018-03-11 | End: 2018-03-13

## 2018-03-11 RX ADMIN — PANTOPRAZOLE SODIUM 40 MILLIGRAM(S): 20 TABLET, DELAYED RELEASE ORAL at 11:39

## 2018-03-11 RX ADMIN — Medication 100 MILLIGRAM(S): at 18:07

## 2018-03-11 RX ADMIN — Medication 100 MILLIGRAM(S): at 11:38

## 2018-03-11 RX ADMIN — AMLODIPINE BESYLATE 10 MILLIGRAM(S): 2.5 TABLET ORAL at 05:37

## 2018-03-11 RX ADMIN — Medication 100 MILLIGRAM(S): at 01:10

## 2018-03-11 RX ADMIN — Medication 25 MILLIGRAM(S): at 05:37

## 2018-03-11 RX ADMIN — Medication 200 MILLIGRAM(S): at 10:00

## 2018-03-11 RX ADMIN — Medication 200 MILLIGRAM(S): at 22:12

## 2018-03-11 RX ADMIN — QUETIAPINE FUMARATE 12.5 MILLIGRAM(S): 200 TABLET, FILM COATED ORAL at 22:12

## 2018-03-11 NOTE — PROGRESS NOTE ADULT - PROBLEM SELECTOR PLAN 3
Pt has asymptomatic UTI. On antibiotics.  - urine culture - coag negative staph > 100,000, most likely contamination will c/w for colitis plan

## 2018-03-12 LAB
ANION GAP SERPL CALC-SCNC: 9 MMOL/L — SIGNIFICANT CHANGE UP (ref 5–17)
BASOPHILS # BLD AUTO: 0 K/UL — SIGNIFICANT CHANGE UP (ref 0–0.2)
BASOPHILS NFR BLD AUTO: 0.7 % — SIGNIFICANT CHANGE UP (ref 0–2)
BUN SERPL-MCNC: 7 MG/DL — SIGNIFICANT CHANGE UP (ref 7–18)
CALCIUM SERPL-MCNC: 8.1 MG/DL — LOW (ref 8.4–10.5)
CHLORIDE SERPL-SCNC: 112 MMOL/L — HIGH (ref 96–108)
CO2 SERPL-SCNC: 21 MMOL/L — LOW (ref 22–31)
CREAT SERPL-MCNC: 0.68 MG/DL — SIGNIFICANT CHANGE UP (ref 0.5–1.3)
CULTURE RESULTS: SIGNIFICANT CHANGE UP
CULTURE RESULTS: SIGNIFICANT CHANGE UP
EOSINOPHIL # BLD AUTO: 0.1 K/UL — SIGNIFICANT CHANGE UP (ref 0–0.5)
EOSINOPHIL NFR BLD AUTO: 1.9 % — SIGNIFICANT CHANGE UP (ref 0–6)
GLUCOSE SERPL-MCNC: 91 MG/DL — SIGNIFICANT CHANGE UP (ref 70–99)
HCT VFR BLD CALC: 30.8 % — LOW (ref 34.5–45)
HGB BLD-MCNC: 10.1 G/DL — LOW (ref 11.5–15.5)
LYMPHOCYTES # BLD AUTO: 0.7 K/UL — LOW (ref 1–3.3)
LYMPHOCYTES # BLD AUTO: 11.2 % — LOW (ref 13–44)
MCHC RBC-ENTMCNC: 30.5 PG — SIGNIFICANT CHANGE UP (ref 27–34)
MCHC RBC-ENTMCNC: 32.9 GM/DL — SIGNIFICANT CHANGE UP (ref 32–36)
MCV RBC AUTO: 92.6 FL — SIGNIFICANT CHANGE UP (ref 80–100)
MONOCYTES # BLD AUTO: 0.7 K/UL — SIGNIFICANT CHANGE UP (ref 0–0.9)
MONOCYTES NFR BLD AUTO: 10.9 % — SIGNIFICANT CHANGE UP (ref 2–14)
NEUTROPHILS # BLD AUTO: 4.9 K/UL — SIGNIFICANT CHANGE UP (ref 1.8–7.4)
NEUTROPHILS NFR BLD AUTO: 75.2 % — SIGNIFICANT CHANGE UP (ref 43–77)
PLATELET # BLD AUTO: 256 K/UL — SIGNIFICANT CHANGE UP (ref 150–400)
POTASSIUM SERPL-MCNC: 3.1 MMOL/L — LOW (ref 3.5–5.3)
POTASSIUM SERPL-SCNC: 3.1 MMOL/L — LOW (ref 3.5–5.3)
RBC # BLD: 3.32 M/UL — LOW (ref 3.8–5.2)
RBC # FLD: 13.1 % — SIGNIFICANT CHANGE UP (ref 10.3–14.5)
SODIUM SERPL-SCNC: 142 MMOL/L — SIGNIFICANT CHANGE UP (ref 135–145)
SPECIMEN SOURCE: SIGNIFICANT CHANGE UP
SPECIMEN SOURCE: SIGNIFICANT CHANGE UP
WBC # BLD: 6.5 K/UL — SIGNIFICANT CHANGE UP (ref 3.8–10.5)
WBC # FLD AUTO: 6.5 K/UL — SIGNIFICANT CHANGE UP (ref 3.8–10.5)

## 2018-03-12 RX ORDER — POTASSIUM CHLORIDE 20 MEQ
10 PACKET (EA) ORAL
Qty: 0 | Refills: 0 | Status: COMPLETED | OUTPATIENT
Start: 2018-03-12 | End: 2018-03-13

## 2018-03-12 RX ORDER — POTASSIUM CHLORIDE 20 MEQ
10 PACKET (EA) ORAL
Qty: 0 | Refills: 0 | Status: COMPLETED | OUTPATIENT
Start: 2018-03-12 | End: 2018-03-12

## 2018-03-12 RX ADMIN — Medication 100 MILLIGRAM(S): at 11:27

## 2018-03-12 RX ADMIN — Medication 100 MILLIEQUIVALENT(S): at 17:52

## 2018-03-12 RX ADMIN — QUETIAPINE FUMARATE 12.5 MILLIGRAM(S): 200 TABLET, FILM COATED ORAL at 21:55

## 2018-03-12 RX ADMIN — Medication 100 MILLIEQUIVALENT(S): at 20:29

## 2018-03-12 RX ADMIN — Medication 200 MILLIGRAM(S): at 21:56

## 2018-03-12 RX ADMIN — Medication 200 MILLIGRAM(S): at 11:39

## 2018-03-12 RX ADMIN — Medication 100 MILLIGRAM(S): at 17:53

## 2018-03-12 RX ADMIN — PANTOPRAZOLE SODIUM 40 MILLIGRAM(S): 20 TABLET, DELAYED RELEASE ORAL at 06:28

## 2018-03-12 RX ADMIN — Medication 100 MILLIGRAM(S): at 01:31

## 2018-03-12 RX ADMIN — Medication 100 MILLIEQUIVALENT(S): at 21:56

## 2018-03-12 NOTE — PROGRESS NOTE ADULT - PROBLEM SELECTOR PLAN 1
Patient has been on protonix and antibiotics abx for two days. Currently does not have any abdominal pain or bleeding. C diff culture negative. Patient has been tolerating diet  -Change cipro and flagyl to PO   -C/w protonix  -GI not consulted per family
-Continue Ciprofloxacion and Flagyl   -c/w Protonix  -IV hydration   -cdiff pending
C diff culture negative. Patient has been tolerating diet  -On cipro and flagyl day 7. Completed antibiotics now  -GI not consulted per family.
Patient has been on protonix and antibiotics abx for two days. Currently does not have any abdominal pain or bleeding. C diff culture negative. Patient has been tolerating diet  -Change cipro and flagyl to PO   -C/w protonix  -GI not consulted per family
Patient has been on protonix and antibiotics abx for two days. Currently does not have any abdominal pain or bleeding. C diff culture negative. Patient has been tolerating diet  -Change cipro and flagyl to PO   -C/w protonix  -GI not consulted per family
Patient has been on protonix and antibiotics for two days. Currently does not have any abdominal pain or bleeding. C diff culture negative. Patient has been tolerating diet  -Change cipro and flagyl to PO   -C/w protonix  -GI not consulted per family
Diet advanced   C/w Ciprofloxacion, Flagyl & Protonix  Received 1 U PRBCs  Post transfusion CBC requested-f/u results  Monitor H&H  ****No GI consult for now per Dr. Arias as daughter does not want to take aggressive measures.
Patient has been on protonix and antibiotics for two days. Currently does not have any abdominal pain or bleeding. C diff culture negative. Patient has been tolerating diet  -Change cipro and flagyl to PO   -C/w protonix  -GI not consulted per family

## 2018-03-12 NOTE — PROGRESS NOTE ADULT - PROBLEM SELECTOR PLAN 4
Family states dementia has been worsening.  -C/w seroquel
-potassium riders given   -follow up BMP
-Needs compression boots
Family states dementia has been worsening.  -C/w seroquel
Stable  C/w Metoprolol & Amlodipine
Family states dementia has been worsening.  -C/w seroquel

## 2018-03-12 NOTE — PROGRESS NOTE ADULT - ATTENDING COMMENTS
discharge planning being done
discharge planning being done
Discharge planning being done.
seen and examined with NP in ER.
Discharge planning being done.

## 2018-03-12 NOTE — PROGRESS NOTE ADULT - PROBLEM SELECTOR PLAN 6
Diagnosed in [place], but never underwent treatment
-Needs compression boots
-c/w Metoprolol and  Amlodipine
-Needs compression boots

## 2018-03-12 NOTE — PROGRESS NOTE ADULT - PROBLEM SELECTOR PLAN 2
Anemia due to blood loss from the colitis. Pt received 1 unit pRBC in ED. Hg currently stable at 11.2.  -Monitor h/h
daughter does not want to take aggressive measures  Received 1 U PRBCs  -monitor CBC, transfuse as needed
Anemia due to blood loss from the colitis. Pt received 1 unit pRBC in ED. Hg currently stable
Anemia due to blood loss from the colitis. Pt received 1 unit pRBC in ED. Hg currently stable at 11.2.  -Monitor h/h
Due to blood loss from colitis  Stool Guaiac positive  Received 1 U PRBCs  Post transfusion CBC requested-f/u results  ****No GI consult for now per Dr. Arias as daughter does not want to take aggressive measures.
Anemia due to blood loss from the colitis. Pt received 1 unit pRBC in ED. Hg currently stable at 11.2.  -Monitor h/h

## 2018-03-12 NOTE — PROGRESS NOTE ADULT - PROBLEM SELECTOR PLAN 5
BP has been controlled  -C/w amlodopine 10 and metoprolol 25
Continue Seroquel
BP has been controlled  -C/w amlodopine 10 and metoprolol 25
Worsening per daughter-reported pt frequently hallucinates    Continue Seroquel
BP has been controlled  -C/w amlodopine 10 and metoprolol 25

## 2018-03-12 NOTE — PROGRESS NOTE ADULT - SUBJECTIVE AND OBJECTIVE BOX
NP Note discussed with  primary attending    76yo female w/ PMH of bedbound, with  dementia, HTN, & Multiple Sclerosis.  Presented after episode of loose bowel movement. CT abdomen and pelvis revealed proctocolitis  Admitted to medicine for Colitis. Stared on cipro and flagyl IV, on contact isolation to r/o cdiff.   Patient seen at bedside alert, awake, confused, appears comfortable. No bowel movement today. Reports poor appetite. Afebrile, no diarrhea, vomiting, abd pain. Tolerating soft diet               INTERVAL HPI/OVERNIGHT EVENTS: no new complaints    MEDICATIONS  (STANDING):  amLODIPine   Tablet 10 milliGRAM(s) Oral daily  ciprofloxacin   IVPB 400 milliGRAM(s) IV Intermittent every 12 hours  dextrose 5% + sodium chloride 0.45%. 1000 milliLiter(s) (50 mL/Hr) IV Continuous <Continuous>  metoprolol succinate ER 25 milliGRAM(s) Oral daily  metroNIDAZOLE  IVPB 500 milliGRAM(s) IV Intermittent every 8 hours  pantoprazole  Injectable 40 milliGRAM(s) IV Push daily  QUEtiapine 12.5 milliGRAM(s) Oral at bedtime    MEDICATIONS  (PRN):      __________________________________________________  REVIEW OF SYSTEMS:    CONSTITUTIONAL: No fever,   RESPIRATORY: No cough; No shortness of breath  CARDIOVASCULAR: No chest pain, no palpitations  GASTROINTESTINAL: No pain. No nausea or vomiting; No diarrhea   NEUROLOGICAL: No headache or numbness, no tremors  Limited ROS         Vital Signs Last 24 Hrs  T(C): 36.3 (08 Mar 2018 07:53), Max: 36.9 (08 Mar 2018 03:10)  T(F): 97.3 (08 Mar 2018 07:53), Max: 98.5 (08 Mar 2018 03:10)  HR: 50 (08 Mar 2018 07:53) (50 - 53)  BP: 112/50 (08 Mar 2018 07:53) (105/- - 116/52)  BP(mean): --  RR: 18 (08 Mar 2018 07:53) (18 - 18)  SpO2: 100% (08 Mar 2018 07:53) (99% - 100%)    ________________________________________________  PHYSICAL EXAM:  GENERAL: NAD  CHEST/LUNG: Clear to ausculitation  HEART: S1 S2  regular  ABDOMEN: Soft, +minimal ttp  Bowel sounds present  EXTREMITIES: no cyanosis; no edema; no calf tenderness  SKIN: warm and dry; no rash  NERVOUS SYSTEM:  Awake and alert; confused ; no new deficits    _________________________________________________  LABS:                        9.6    18.6  )-----------( 288      ( 08 Mar 2018 06:03 )             29.0     03-08    146<H>  |  114<H>  |  32<H>  ----------------------------<  126<H>  2.6<LL>   |  21<L>  |  1.07    Ca    8.3<L>      08 Mar 2018 06:03  Phos  3.4     03-07  Mg     2.3     03-07    TPro  6.2  /  Alb  2.7<L>  /  TBili  0.3  /  DBili  x   /  AST  29  /  ALT  33  /  AlkPhos  110  03-07    PT/INR - ( 07 Mar 2018 06:57 )   PT: 10.4 sec;   INR: 0.96 ratio         PTT - ( 06 Mar 2018 18:21 )  PTT:26.2 sec  Urinalysis Basic - ( 06 Mar 2018 23:04 )    Color: Yellow / Appearance: Clear / S.015 / pH: x  Gluc: x / Ketone: Negative  / Bili: Negative / Urobili: Negative   Blood: x / Protein: 30 mg/dL / Nitrite: Negative   Leuk Esterase: Moderate / RBC: 0-2 /HPF / WBC 6-10 /HPF   Sq Epi: x / Non Sq Epi: Few /HPF / Bacteria: Moderate /HPF      CAPILLARY BLOOD GLUCOSE      POCT Blood Glucose.: 171 mg/dL (07 Mar 2018 23:29)        RADIOLOGY & ADDITIONAL TESTS:    Imaging Personally Reviewed:  YES/NO    Consultant(s) Notes Reviewed:   YES/ No    Care Discussed with Consultants :     Plan of care was discussed with patient and /or primary care giver; all questions and concerns were addressed and care was aligned with patient's wishes.
Patient is a 77y old  Female who presents with a chief complaint of Bloody stool (09 Mar 2018 15:49)      INTERVAL HPI/OVERNIGHT EVENTS:    MEDICATIONS  (STANDING):  amLODIPine   Tablet 10 milliGRAM(s) Oral daily  ciprofloxacin   IVPB 400 milliGRAM(s) IV Intermittent every 12 hours  dextrose 5% + sodium chloride 0.45%. 1000 milliLiter(s) (50 mL/Hr) IV Continuous <Continuous>  metoprolol succinate ER 25 milliGRAM(s) Oral daily  metroNIDAZOLE  IVPB 500 milliGRAM(s) IV Intermittent every 8 hours  pantoprazole    Tablet 40 milliGRAM(s) Oral before breakfast  potassium chloride  10 mEq/100 mL IVPB 10 milliEquivalent(s) IV Intermittent every 1 hour  QUEtiapine 12.5 milliGRAM(s) Oral at bedtime    MEDICATIONS  (PRN):  acetaminophen   Tablet. 650 milliGRAM(s) Oral every 6 hours PRN Mild Pain (1 - 3)      Allergies    No Known Allergies    Intolerances        REVIEW OF SYSTEMS:  CONSTITUTIONAL: No fevers    Vital Signs Last 24 Hrs  T(C): 37 (12 Mar 2018 20:31), Max: 37.1 (12 Mar 2018 05:22)  T(F): 98.6 (12 Mar 2018 20:31), Max: 98.7 (12 Mar 2018 05:22)  HR: 80 (12 Mar 2018 20:31) (76 - 102)  BP: 117/57 (12 Mar 2018 20:31) (113/61 - 124/49)  BP(mean): --  RR: 18 (12 Mar 2018 20:31) (17 - 18)  SpO2: 96% (12 Mar 2018 20:31) (96% - 97%)    PHYSICAL EXAM:  GENERAL: NAD, well-groomed, well-developed  HEAD:  Atraumatic, Normocephalic  EYES: EOMI, PERRLA, conjunctiva and sclera clear  ENMT: No tonsillar erythema, exudates, or enlargement; Moist mucous membranes, Good dentition, No lesions  NECK: Supple, No JVD, Normal thyroid  NERVOUS SYSTEM:  baseline mental status  CHEST/LUNG: Clear to percussion bilaterally; No rales, rhonchi, wheezing, or rubs  HEART: Regular rate and rhythm; No murmurs, rubs, or gallops  ABDOMEN: Soft, Nontender, Nondistended; Bowel sounds present  EXTREMITIES:  2+ Peripheral Pulses, No clubbing, cyanosis, or edema    LABS:                        10.1   6.5   )-----------( 256      ( 12 Mar 2018 07:38 )             30.8     03-12    142  |  112<H>  |  7   ----------------------------<  91  3.1<L>   |  21<L>  |  0.68    Ca    8.1<L>      12 Mar 2018 07:38          CAPILLARY BLOOD GLUCOSE          RADIOLOGY & ADDITIONAL TESTS:    Imaging Personally Reviewed:  [ ] YES  [ ] NO    Consultant(s) Notes Reviewed:  [ ] YES  [ ] NO    Care Discussed with Consultants/Other Providers [ ] YES  [ ] NO
Patient seen and examined at bedside. Was transferred from the ED overnight to [facility]. Has no new complaints and says she currently does not have any abdominal pain. Can be discharged back to [place] possibly later today or tomorrow.    Vital Signs Last 24 Hrs  T(C): 36.6 (11 Mar 2018 14:20), Max: 37.8 (10 Mar 2018 20:23)  T(F): 97.8 (11 Mar 2018 14:20), Max: 100.1 (10 Mar 2018 20:23)  HR: 72 (11 Mar 2018 14:20) (72 - 86)  BP: 103/56 (11 Mar 2018 14:20) (103/56 - 127/50)  BP(mean): --  RR: 19 (11 Mar 2018 14:20) (17 - 19)  SpO2: 100% (11 Mar 2018 14:20) (97% - 100%)    .  LABS:                                        10.6   8.4   )-----------( 279      ( 10 Mar 2018 08:05 )             33.8           MEDICATIONS  (STANDING):  amLODIPine   Tablet 10 milliGRAM(s) Oral daily  ciprofloxacin   IVPB 400 milliGRAM(s) IV Intermittent every 12 hours  dextrose 5% + sodium chloride 0.45%. 1000 milliLiter(s) (50 mL/Hr) IV Continuous <Continuous>  metoprolol succinate ER 25 milliGRAM(s) Oral daily  metroNIDAZOLE  IVPB 500 milliGRAM(s) IV Intermittent every 8 hours  pantoprazole  Injectable 40 milliGRAM(s) IV Push daily  QUEtiapine 12.5 milliGRAM(s) Oral at bedtime
Patient seen and examined at bedside. Was transferred from the ED overnight to [facility]. Has no new complaints and says she currently does not have any abdominal pain. Can be discharged back long  term placement  03-12    142  |  112<H>  |  7   ----------------------------<  91  3.1<L>   |  21<L>  |  0.68    Ca    8.1<L>      12 Mar 2018 07:38        .  03-12    142  |  112<H>  |  7   ----------------------------<  91  3.1<L>   |  21<L>  |  0.68    Ca    8.1<L>      12 Mar 2018 07:38    Vital Signs Last 24 Hrs  T(C): 37.1 (12 Mar 2018 14:16), Max: 37.2 (11 Mar 2018 21:01)  T(F): 98.7 (12 Mar 2018 14:16), Max: 99 (11 Mar 2018 21:01)  HR: 102 (12 Mar 2018 14:16) (76 - 102)  BP: 124/49 (12 Mar 2018 14:16) (113/61 - 124/49)  BP(mean): --  RR: 17 (12 Mar 2018 14:16) (17 - 18)  SpO2: 97% (12 Mar 2018 14:16) (97% - 97%)          MEDICATIONS  (STANDING):  amLODIPine   Tablet 10 milliGRAM(s) Oral daily  ciprofloxacin   IVPB 400 milliGRAM(s) IV Intermittent every 12 hours  dextrose 5% + sodium chloride 0.45%. 1000 milliLiter(s) (50 mL/Hr) IV Continuous <Continuous>  metoprolol succinate ER 25 milliGRAM(s) Oral daily  metroNIDAZOLE  IVPB 500 milliGRAM(s) IV Intermittent every 8 hours  pantoprazole  Injectable 40 milliGRAM(s) IV Push daily  QUEtiapine 12.5 milliGRAM(s) Oral at bedtime
Patient seen and examined at bedside. Was transferred from the ED overnight to [facility]. Has no new complaints and says she currently does not have any abdominal pain. Can be discharged back to [place] possibly later today or tomorrow.    ICU Vital Signs Last 24 Hrs  T(C): 36.4 (09 Mar 2018 05:08), Max: 36.7 (08 Mar 2018 22:07)  T(F): 97.5 (09 Mar 2018 05:08), Max: 98.1 (08 Mar 2018 22:07)  HR: 90 (09 Mar 2018 09:50) (70 - 91)  BP: 125/77 (09 Mar 2018 09:50) (118/83 - 136/74)  RR: 18 (09 Mar 2018 09:50) (17 - 20)  SpO2: 99% (09 Mar 2018 09:50) (95% - 100%)    .  LABS:                         11.2   10.8  )-----------( 315      ( 09 Mar 2018 07:16 )             35.8     03-09    144  |  115<H>  |  12  ----------------------------<  90  3.6   |  19<L>  |  0.55    Ca    8.4      09 Mar 2018 07:16    MEDICATIONS  (STANDING):  amLODIPine   Tablet 10 milliGRAM(s) Oral daily  ciprofloxacin   IVPB 400 milliGRAM(s) IV Intermittent every 12 hours  dextrose 5% + sodium chloride 0.45%. 1000 milliLiter(s) (50 mL/Hr) IV Continuous <Continuous>  metoprolol succinate ER 25 milliGRAM(s) Oral daily  metroNIDAZOLE  IVPB 500 milliGRAM(s) IV Intermittent every 8 hours  pantoprazole  Injectable 40 milliGRAM(s) IV Push daily  QUEtiapine 12.5 milliGRAM(s) Oral at bedtime
10.6   8.4   )-----------( 279      ( 10 Mar 2018 08:05 )             33.8   Patient seen and examined at bedside. Was transferred from the ED overnight to [facility]. Has no new complaints and says she currently does not have any abdominal p      ICU Vital Signs Last 24 Hrs  T(C): 36.4 (09 Mar 2018 05:08), Max: 36.7 (08 Mar 2018 22:07)  T(F): 97.5 (09 Mar 2018 05:08), Max: 98.1 (08 Mar 2018 22:07)  HR: 90 (09 Mar 2018 09:50) (70 - 91)  BP: 125/77 (09 Mar 2018 09:50) (118/83 - 136/74)  RR: 18 (09 Mar 2018 09:50) (17 - 20)  SpO2: 99% (09 Mar 2018 09:50) (95% - 100%)    .  LABS:                   MEDICATIONS  (STANDING):  amLODIPine   Tablet 10 milliGRAM(s) Oral daily  ciprofloxacin   IVPB 400 milliGRAM(s) IV Intermittent every 12 hours  dextrose 5% + sodium chloride 0.45%. 1000 milliLiter(s) (50 mL/Hr) IV Continuous <Continuous>  metoprolol succinate ER 25 milliGRAM(s) Oral daily  metroNIDAZOLE  IVPB 500 milliGRAM(s) IV Intermittent every 8 hours  pantoprazole  Injectable 40 milliGRAM(s) IV Push daily  QUEtiapine 12.5 milliGRAM(s) Oral at bedtime
NP Note discussed with  primary attending    77y old Female who presented with bloody stool.  LOS #1.  Denies pain but stated she did not feel so good.      INTERVAL HPI/OVERNIGHT EVENTS: no new complaints    MEDICATIONS  (STANDING):  amLODIPine   Tablet 10 milliGRAM(s) Oral daily  ciprofloxacin   IVPB 400 milliGRAM(s) IV Intermittent every 12 hours  metoprolol succinate ER 25 milliGRAM(s) Oral daily  metroNIDAZOLE  IVPB 500 milliGRAM(s) IV Intermittent every 8 hours  pantoprazole  Injectable 40 milliGRAM(s) IV Push daily  QUEtiapine 12.5 milliGRAM(s) Oral at bedtime  sodium chloride 0.9%. 1000 milliLiter(s) (75 mL/Hr) IV Continuous <Continuous>    MEDICATIONS  (PRN):      __________________________________________________  REVIEW OF SYSTEMS:    CONSTITUTIONAL: No fever  NEUROLOGICAL: Dementia  PSYCHIATRY: Poor historian  ALL OTHER  ROS negative        Vital Signs Last 24 Hrs  T(C): 36.7 (07 Mar 2018 15:34), Max: 37.4 (06 Mar 2018 16:41)  T(F): 98 (07 Mar 2018 15:34), Max: 99.3 (06 Mar 2018 16:41)  HR: 77 (07 Mar 2018 15:34) (70 - 88)  BP: 111/74 (07 Mar 2018 15:34) (104/66 - 137/68)  BP(mean): --  RR: 18 (07 Mar 2018 15:34) (15 - 18)  SpO2: 99% (07 Mar 2018 15:34) (98% - 99%)    ________________________________________________  PHYSICAL EXAM:  GENERAL: NAD  CHEST/LUNG: Clear to auscultation bilaterally with good air entry   HEART: S1 S2, regular; no murmurs, gallops or rubs  ABDOMEN: Soft, Nontender, Nondistended; Bowel sounds present x 4 quad  EXTREMITIES: No cyanosis; no edema; no calf tenderness  SKIN: Warm and dry  NERVOUS SYSTEM:  Awake and alert  _________________________________________________  LABS:                        7.7    28.6  )-----------( 339      ( 07 Mar 2018 06:57 )             25.5     03-07    143  |  112<H>  |  26<H>  ----------------------------<  63<L>  3.5   |  22  |  0.61    Ca    8.7      07 Mar 2018 06:57  Phos  3.4     03-07  Mg     2.3     03-07    TPro  6.2  /  Alb  2.7<L>  /  TBili  0.3  /  DBili  x   /  AST  29  /  ALT  33  /  AlkPhos  110  03-07    PT/INR - ( 07 Mar 2018 06:57 )   PT: 10.4 sec;   INR: 0.96 ratio         PTT - ( 06 Mar 2018 18:21 )  PTT:26.2 sec  Urinalysis Basic - ( 06 Mar 2018 23:04 )    Color: Yellow / Appearance: Clear / S.015 / pH: x  Gluc: x / Ketone: Negative  / Bili: Negative / Urobili: Negative   Blood: x / Protein: 30 mg/dL / Nitrite: Negative   Leuk Esterase: Moderate / RBC: 0-2 /HPF / WBC 6-10 /HPF   Sq Epi: x / Non Sq Epi: Few /HPF / Bacteria: Moderate /HPF      RADIOLOGY & ADDITIONAL TESTS:    Consultant(s) Notes Reviewed:   YES    Care Discussed with Consultants :     Plan of care was discussed with patient and /or primary care giver; all questions and concerns were addressed and care was aligned with patient's wishes.
Patient seen and examined at bedside. Was transferred from the ED overnight to [facility]. Has no new complaints and says she currently does not have any abdominal pain. Can be discharged back to [place] possibly later today or tomorrow.    ICU Vital Signs Last 24 Hrs  T(C): 36.4 (09 Mar 2018 05:08), Max: 36.7 (08 Mar 2018 22:07)  T(F): 97.5 (09 Mar 2018 05:08), Max: 98.1 (08 Mar 2018 22:07)  HR: 90 (09 Mar 2018 09:50) (70 - 91)  BP: 125/77 (09 Mar 2018 09:50) (118/83 - 136/74)  RR: 18 (09 Mar 2018 09:50) (17 - 20)  SpO2: 99% (09 Mar 2018 09:50) (95% - 100%)    .  LABS:                         11.2   10.8  )-----------( 315      ( 09 Mar 2018 07:16 )             35.8     03-09    144  |  115<H>  |  12  ----------------------------<  90  3.6   |  19<L>  |  0.55    Ca    8.4      09 Mar 2018 07:16    MEDICATIONS  (STANDING):  amLODIPine   Tablet 10 milliGRAM(s) Oral daily  ciprofloxacin   IVPB 400 milliGRAM(s) IV Intermittent every 12 hours  dextrose 5% + sodium chloride 0.45%. 1000 milliLiter(s) (50 mL/Hr) IV Continuous <Continuous>  metoprolol succinate ER 25 milliGRAM(s) Oral daily  metroNIDAZOLE  IVPB 500 milliGRAM(s) IV Intermittent every 8 hours  pantoprazole  Injectable 40 milliGRAM(s) IV Push daily  QUEtiapine 12.5 milliGRAM(s) Oral at bedtime

## 2018-03-12 NOTE — PROGRESS NOTE ADULT - ASSESSMENT
Ms. Shepherd is a 77 year old female with PMHx of dementia, HTN, MS who presented to the hospital after two episodes of bloody bowel movements in her nursing home on 3/6. In the ED, her hemoglobin was 9.1 and wbc 29.5 and patient denied any nausea, vomiting. 1 unit pRBC was given in ED and CT abdomen done which showed proctocolitis. Urinalysis also showed +UTI and patient was admitted to the hospital for proctocolitis and UTI.
Ms. Shepherd is a 77 year old female with PMHx of dementia, HTN, MS who presented to the hospital after two episodes of bloody bowel movements in her nursing home on 3/6. In the ED, her hemoglobin was 9.1 and wbc 29.5 and patient denied any nausea, vomiting. 1 unit pRBC was given in ED and CT abdomen done which showed proctocolitis. Urinalysis also showed +UTI and patient was admitted to the hospital for proctocolitis and UTI. Ciprofloxacin and flagyl was started for proctocolitis and wbc has come down and hemoglobin has been stable s/p 1 unit pRBC. C diff culture negative. GI has not consulted as family does not want to take aggressive measures.
76yo female w/ Proctocolitis, stable
Ms. Shepherd is a 77 year old female with PMHx of dementia, HTN, MS who presented to the hospital after two episodes of bloody bowel movements in her nursing home on 3/6. In the ED, her hemoglobin was 9.1 and wbc 29.5 and patient denied any nausea, vomiting. 1 unit pRBC was given in ED and CT abdomen done which showed proctocolitis. Urinalysis also showed +UTI and patient was admitted to the hospital for proctocolitis and UTI. Ciprofloxacin and flagyl was started for proctocolitis and wbc has come down and hemoglobin has been stable s/p 1 unit pRBC. C diff culture negative. GI has not consulted as family does not want to take aggressive measures.
Ms. Shepherd is a 77 year old female with PMHx of dementia, HTN, MS who presented to the hospital after two episodes of bloody bowel movements in her nursing home on 3/6. In the ED, her hemoglobin was 9.1 and wbc 29.5 and patient denied any nausea, vomiting. 1 unit pRBC was given in ED and CT abdomen done which showed proctocolitis. Urinalysis also showed +UTI and patient was admitted to the hospital for proctocolitis and UTI. Ciprofloxacin and flagyl was started for proctocolitis and wbc has come down and hemoglobin has been stable s/p 1 unit pRBC. C diff culture negative. GI has not consulted as family does not want to take aggressive measures.

## 2018-03-12 NOTE — PROGRESS NOTE ADULT - PROBLEM SELECTOR PROBLEM 6
Multiple sclerosis
Need for prophylactic measure
Hypertension
Need for prophylactic measure

## 2018-03-12 NOTE — PROGRESS NOTE ADULT - PROBLEM SELECTOR PLAN 3
Pt has asymptomatic UTI. Completed antibiotics now .    Urine culture results from 3/7 grew numerous coag negative staphylococcus

## 2018-03-13 VITALS
TEMPERATURE: 98 F | OXYGEN SATURATION: 97 % | DIASTOLIC BLOOD PRESSURE: 81 MMHG | SYSTOLIC BLOOD PRESSURE: 124 MMHG | RESPIRATION RATE: 17 BRPM | HEART RATE: 111 BPM

## 2018-03-13 LAB
ANION GAP SERPL CALC-SCNC: 9 MMOL/L — SIGNIFICANT CHANGE UP (ref 5–17)
BUN SERPL-MCNC: 6 MG/DL — LOW (ref 7–18)
CALCIUM SERPL-MCNC: 8.2 MG/DL — LOW (ref 8.4–10.5)
CHLORIDE SERPL-SCNC: 113 MMOL/L — HIGH (ref 96–108)
CO2 SERPL-SCNC: 20 MMOL/L — LOW (ref 22–31)
CREAT SERPL-MCNC: 0.66 MG/DL — SIGNIFICANT CHANGE UP (ref 0.5–1.3)
GLUCOSE SERPL-MCNC: 98 MG/DL — SIGNIFICANT CHANGE UP (ref 70–99)
POTASSIUM SERPL-MCNC: 3.9 MMOL/L — SIGNIFICANT CHANGE UP (ref 3.5–5.3)
POTASSIUM SERPL-SCNC: 3.9 MMOL/L — SIGNIFICANT CHANGE UP (ref 3.5–5.3)
SODIUM SERPL-SCNC: 142 MMOL/L — SIGNIFICANT CHANGE UP (ref 135–145)

## 2018-03-13 PROCEDURE — 82272 OCCULT BLD FECES 1-3 TESTS: CPT

## 2018-03-13 PROCEDURE — 97162 PT EVAL MOD COMPLEX 30 MIN: CPT

## 2018-03-13 PROCEDURE — 87086 URINE CULTURE/COLONY COUNT: CPT

## 2018-03-13 PROCEDURE — 85610 PROTHROMBIN TIME: CPT

## 2018-03-13 PROCEDURE — 82728 ASSAY OF FERRITIN: CPT

## 2018-03-13 PROCEDURE — 86900 BLOOD TYPING SEROLOGIC ABO: CPT

## 2018-03-13 PROCEDURE — 84443 ASSAY THYROID STIM HORMONE: CPT

## 2018-03-13 PROCEDURE — 83550 IRON BINDING TEST: CPT

## 2018-03-13 PROCEDURE — 83010 ASSAY OF HAPTOGLOBIN QUANT: CPT

## 2018-03-13 PROCEDURE — 85730 THROMBOPLASTIN TIME PARTIAL: CPT

## 2018-03-13 PROCEDURE — 99285 EMERGENCY DEPT VISIT HI MDM: CPT | Mod: 25

## 2018-03-13 PROCEDURE — 36430 TRANSFUSION BLD/BLD COMPNT: CPT

## 2018-03-13 PROCEDURE — 80048 BASIC METABOLIC PNL TOTAL CA: CPT

## 2018-03-13 PROCEDURE — P9040: CPT

## 2018-03-13 PROCEDURE — 83615 LACTATE (LD) (LDH) ENZYME: CPT

## 2018-03-13 PROCEDURE — 86923 COMPATIBILITY TEST ELECTRIC: CPT

## 2018-03-13 PROCEDURE — 87040 BLOOD CULTURE FOR BACTERIA: CPT

## 2018-03-13 PROCEDURE — 87493 C DIFF AMPLIFIED PROBE: CPT

## 2018-03-13 PROCEDURE — 81001 URINALYSIS AUTO W/SCOPE: CPT

## 2018-03-13 PROCEDURE — 93005 ELECTROCARDIOGRAM TRACING: CPT

## 2018-03-13 PROCEDURE — 85045 AUTOMATED RETICULOCYTE COUNT: CPT

## 2018-03-13 PROCEDURE — 85027 COMPLETE CBC AUTOMATED: CPT

## 2018-03-13 PROCEDURE — 82962 GLUCOSE BLOOD TEST: CPT

## 2018-03-13 PROCEDURE — 84484 ASSAY OF TROPONIN QUANT: CPT

## 2018-03-13 PROCEDURE — 86850 RBC ANTIBODY SCREEN: CPT

## 2018-03-13 PROCEDURE — 80061 LIPID PANEL: CPT

## 2018-03-13 PROCEDURE — 74177 CT ABD & PELVIS W/CONTRAST: CPT

## 2018-03-13 PROCEDURE — 82746 ASSAY OF FOLIC ACID SERUM: CPT

## 2018-03-13 PROCEDURE — 84466 ASSAY OF TRANSFERRIN: CPT

## 2018-03-13 PROCEDURE — 84100 ASSAY OF PHOSPHORUS: CPT

## 2018-03-13 PROCEDURE — 82306 VITAMIN D 25 HYDROXY: CPT

## 2018-03-13 PROCEDURE — 96374 THER/PROPH/DIAG INJ IV PUSH: CPT

## 2018-03-13 PROCEDURE — 71045 X-RAY EXAM CHEST 1 VIEW: CPT

## 2018-03-13 PROCEDURE — 83036 HEMOGLOBIN GLYCOSYLATED A1C: CPT

## 2018-03-13 PROCEDURE — 86901 BLOOD TYPING SEROLOGIC RH(D): CPT

## 2018-03-13 PROCEDURE — 80053 COMPREHEN METABOLIC PANEL: CPT

## 2018-03-13 PROCEDURE — 96375 TX/PRO/DX INJ NEW DRUG ADDON: CPT

## 2018-03-13 PROCEDURE — 83735 ASSAY OF MAGNESIUM: CPT

## 2018-03-13 PROCEDURE — 82607 VITAMIN B-12: CPT

## 2018-03-13 RX ORDER — METRONIDAZOLE 500 MG
1 TABLET ORAL
Qty: 0 | Refills: 0 | COMMUNITY

## 2018-03-13 RX ORDER — CIPROFLOXACIN LACTATE 400MG/40ML
1 VIAL (ML) INTRAVENOUS
Qty: 0 | Refills: 0 | COMMUNITY

## 2018-03-13 RX ADMIN — PANTOPRAZOLE SODIUM 40 MILLIGRAM(S): 20 TABLET, DELAYED RELEASE ORAL at 05:25

## 2018-03-13 RX ADMIN — Medication 100 MILLIGRAM(S): at 01:41

## 2018-03-13 RX ADMIN — Medication 100 MILLIEQUIVALENT(S): at 01:30

## 2018-03-13 RX ADMIN — Medication 100 MILLIEQUIVALENT(S): at 02:25

## 2018-03-13 RX ADMIN — Medication 200 MILLIGRAM(S): at 10:16

## 2018-03-13 RX ADMIN — Medication 25 MILLIGRAM(S): at 05:25

## 2018-03-13 RX ADMIN — AMLODIPINE BESYLATE 10 MILLIGRAM(S): 2.5 TABLET ORAL at 05:25

## 2018-03-13 RX ADMIN — Medication 100 MILLIGRAM(S): at 10:16

## 2018-03-13 RX ADMIN — Medication 100 MILLIEQUIVALENT(S): at 00:00

## 2019-12-02 NOTE — ED PROVIDER NOTE - CRITERIA ADMIT PEDS PT
PRINCIPAL DISCHARGE DIAGNOSIS  Diagnosis: Altered mental status  Assessment and Plan of Treatment:       SECONDARY DISCHARGE DIAGNOSES  Diagnosis: Altered mental status  Assessment and Plan of Treatment:     Diagnosis: Alcohol intoxication  Assessment and Plan of Treatment:
No

## 2024-09-05 NOTE — DIETITIAN INITIAL EVALUATION ADULT. - % CHANGE
[4] : 4 [Sharp] : sharp [Meds] : meds [Physical therapy] : physical therapy [] : no [de-identified] : XR 16.66

## 2025-03-10 NOTE — H&P ADULT - NEGATIVE PSYCHIATRIC SYMPTOMS
Presents to ED with complaints of chest pain intermittently. Patient reports it is under left breast and \"feels like someone is taking a spoon and digging.\" Patient reports increase in pain with activity. States is has been \"going on for weeks\" Denies pain at this time.   no anxiety/no depression

## 2025-06-10 NOTE — DIETITIAN INITIAL EVALUATION ADULT. - NUTRITION INTERVENTION
Pain Management Discharge Instructions:  Facet Joint Injections / Medial Branch Block    Diagnostic Injection Pain Score Sheet    Please track your pain using the pain diary provided to you.    Please remember to bring back with you to next appoitment.        0         1-2                  3-4                    5-6                 7-8                      9-10  No hurt       Hurts       Hurts        Hurts       Hurts           Hurts         Little bit         Little more        Even more        Whole lot             Worst    What are facet joint injections?    Facet joint injections/Medial Branch blocks are commonly used to determine if the joints in your back is what is causing back pain. The injections are primarily diagnostic,  meaning that they help your doctor determine the cause of your back pain but may not provide you with any long-term relief.  In these diagnostic procedures, it is a 2-step process, meaning the physician will inject an anesthetic medication into the facet joint or near nerves going to the facet joint once with a long-acting anesthetic and see what your response is and again on a different date with a short-acting anesthetic. This should cause temporary pain relief.     Once you and your doctor determine that you have pain relief from the anesthetic, it is confirmed pain is coming from the facet joints. At this point treatment options may be recommended to reduce your pain for a longer period of time. This includes a procedure called a RADIOFREQUENCY ABLATION.      What happens during the procedure?  You will be taken to the procedure room and positioned face down.  The area to be injected will be cleansed.  Using X-ray the needle will be placed either into the joint or close to the medial branch nerves. The medication is injected once correct position is confirmed using a contrast dye and  X-ray.  Following the procedure you will be transported to the recovery area where your vital signs  will continue to be monitored.  You may be given a PAIN DIARY to record what you feel for the next several hours.  This is important for making decisions about the next step in your care, so keep track of your pain.    Although rare, risks include allergic reactions to the medication, infection, and nerve damage.    What should I do after the procedure?   You will be in the recovery area for 20-30 minutes after the procedure. If the procedure is meant to be a test we would like you to do things that would bring about your usual pain. Please do not go home and take a nap. Be active and attempt to trigger your usual pain. This will help us determine if the areas injected are the ones causing your pain.     Discharge instructions  IF YOU HAD PAIN RELIEF:  Please keep track of your pain relief for the next 6 hours by completing the pain diary. During this period try to be active and do activities that normally cause you pain.  Follow the instructions on your pain diary to report your results.  PLEASE bring the pain diary to your next appointment.  Please refrain from taking any pain medications during these six hours.  Remove your dressing this evening or tomorrow.  Check the site for swelling, redness, warmth, or signs of infection, such as fever or drainage from the site.  If present, please contact the physician's office.  A small bruise and tenderness at the site is normal for a few days.  Use ice for the first 48 hours.  Apply for 20 minutes every 1-2 hours.  DO NOT USE HEAT for 48 hours.  This includes creams, prolonged hot baths, hot tubs, etc.  You may have some temporary numbness; this will go away in a couple hours.  If you received sedation do not drive or operate machinery, drink alcohol, stay alone, make any important decisions, sign important papers or return to work until the next day.  If you received steroids, they may cause your blood sugar to increase temporarily.  If you are diabetic check your blood  glucose levels more closely and report any major changes to your primary doctor.   If you received a thoracic injection (the chest area) and experience shortness of breath or difficulty breathing, please go directly to your nearest emergency room, request a chest X-ray, and have the staff contact your Pain Management physician.      [email or web address]  The information presented is intended for general information and educational purposes. It is not intended to replace the advice of your health care provider. Contact your health care provider if you believe you have a health problem.      Collaboration and Referral of Nutrition Care/Meals and Snack/Medical Food Supplements/Feeding Assistance/Vitamin